# Patient Record
Sex: MALE | Race: WHITE
[De-identification: names, ages, dates, MRNs, and addresses within clinical notes are randomized per-mention and may not be internally consistent; named-entity substitution may affect disease eponyms.]

---

## 2018-05-01 NOTE — HP
DATE OF ADMISSION: 05/04/2018

 

 

REASON FOR ADMISSION:  Complex, chronically incarcerated ventral hernia.

 

BRIEF HISTORY:  This is a 67-year-old gentleman with a known ventral hernia.  He has

had this for many years, and over this time course, the hernia has gotten larger, and

now he wishes to have this repaired.  There are times when he feels some discomfort

in the hernia, and this usually occurs with Valsalva-type maneuvers.  He has had no

change in bowel habits.

 

PAST MEDICAL HISTORY:  No coronary disease, hypertension, or diabetes.

 

PAST SURGICAL HISTORY:  None.

 

MEDICATIONS:  Vitamins.

 

ALLERGIES:  None.

 

SOCIAL HISTORY:  The patient is a .  He does not smoke or drink.

 

PHYSICAL EXAMINATION: 

Patient was examined in erect and supine position.  He has a chronically incarcerated

ventral hernia in the midline.  The defects are not truly appreciated due to its

chronically incarcerated nature.  He has a mild diastasis above the hernia as well.

 

IMPRESSION AND PLAN:  Chronically incarcerated ventral hernia.  This is a 57-year-old

gentleman with an enlarging known chronic hernia.  At this point, it is causing him

some discomfort.  Due to its enlargement, he wishes to have this repaired.  The

patient and I had a long conversation regarding pros and cons of various surgical

approaches, options of doing nothing and observation, repair at this time.  The

patient has opted to proceed with an open repair with a possible component separation

if needed depending on how much frayed tissue is identified at the time of surgery

for a large piece of mesh and repair behind the musculature.  The indications,

alternatives, and complications discussed.  Questions answered.  We will plan to

obtain written consent the day of surgery.

 

 

 

MANGO ROCHA M.D.

 

[id number]

DD: 04/10/2018 12:23

DT: 04/10/2018 12:47

Job #:  [id number]

## 2018-05-04 ENCOUNTER — HOSPITAL ENCOUNTER (INPATIENT)
Dept: HOSPITAL 74 - FASU | Age: 58
LOS: 1 days | Discharge: HOME | DRG: 355 | End: 2018-05-05
Attending: SURGERY | Admitting: SURGERY
Payer: COMMERCIAL

## 2018-05-04 VITALS — BODY MASS INDEX: 26.4 KG/M2

## 2018-05-04 DIAGNOSIS — K43.6: Primary | ICD-10-CM

## 2018-05-04 PROCEDURE — 0WUF0JZ SUPPLEMENT ABDOMINAL WALL WITH SYNTHETIC SUBSTITUTE, OPEN APPROACH: ICD-10-PCS | Performed by: SURGERY

## 2018-05-04 PROCEDURE — 0DBU0ZZ EXCISION OF OMENTUM, OPEN APPROACH: ICD-10-PCS | Performed by: SURGERY

## 2018-05-04 RX ADMIN — ACETAMINOPHEN PRN MG: 325 TABLET ORAL at 18:33

## 2018-05-04 RX ADMIN — IBUPROFEN SCH: 800 INJECTION INTRAVENOUS at 18:59

## 2018-05-04 RX ADMIN — IBUPROFEN SCH MG: 800 INJECTION INTRAVENOUS at 13:51

## 2018-05-04 NOTE — OP
DATE OF OPERATION:  05/04/2018

 

PREOPERATIVE DIAGNOSIS:  Chronically incarcerated complex ventral hernia.

 

POSTOPERATIVE DIAGNOSIS:  Chronically incarcerated complex ventral hernia.

 

PROCEDURE:  Bilateral component separation, repair of incarcerated ventral 
hernia

with mesh, partial omentectomy.  

 

SURGEON:  Juan José Cronin MD 

 

ASSISTANT:  Power العراقي DO

 

ANESTHESIA:  Rudy Cam MD (general).

 

ESTIMATED BLOOD LOSS:  Minimal.

 

SPECIMEN:  Portion of omentum and hernia sac.

 

INDICATION FOR PROCEDURE:  This is a 57-year-old gentleman with a large, 
chronically

incarcerated complex ventral hernia.  It is causing him discomfort.  He wishes 
to

have this repaired.  

 

DESCRIPTION OF PROCEDURE:  The patient was identified and appropriately 
positioned on

the operating room table.  After placement of general anesthesia, the abdomen 
was

prepped and draped in the usual sterile fashion with ChloraPrep.  A midline 
incision

was made and deepened to the subcutaneous tissue.  The hernia was identified and

freed from the subcutaneous tissue down to the level of the fascia 
circumferentially

with blunt and sharp dissection.  The hernia sac was then opened and contained

omentum.  The sac itself was a slider.  Portions of the omentum were serially

clamped, divided and tied with 3-0 chromic sutures.  The sac was oversewn and 
reduced

back into the abdominal cavity.  The right retrorectus space was entered by

identifying the posterior sheath on the right side.  The right sheath was then

scored.  The retrorectus space was subsequently developed bluntly up to the

perforating vessels.  At this point, the fascia was just divided, freeing the

transversus medially and the obliques laterally.  Then, myofascial separation 
ensued

approximately 5-6 inches above and below the actual defect.  The midline fascia 
was

divided in a similar fashion.  

 

Next, attention was focused on the left side and a similar approach to opening 
and

doing the myofascial separation was done.  The left retrorectus space was 
entered by

dividing the left posterior sheath.  This space was developed bluntly to the 
level of

the perforating vessels.  At this point, the transversus was scored and allowed

placement of the mesh way beyond the junction of the transversus, obliques and 
rectus

junction.  Again, this was done approximately 5-6 inches above and below the 
actual

defect.  The posterior layer (transversus) was reapproximated with a running 
locking

3-0 Maxon suture.  The defect was measured and a large 15 x 15 ProGrip as well 
as a 6

x 10 piece of Telio was used for the operative repair.  The mesh was the core.  
The

two pieces were sewn together in a hybrid.  The Telio was placed on the 
transversus

side and the ProGrip with the  up towards the rectus.  The mesh was fanned 
out,

covered the defect, and then anchored with interrupted AbsorbaTack sutures.  
The mesh

itself was irrigated.  The operative field was examined and noted to be 
hemostatic. 

A sponge and instrument count was done at this point.  All sponge and needle 
counts

were correct.  The midline fascia was then reapproximated with interrupted 0 
Prolene

sutures.  The subcutaneous space was irrigated.  The midline was reapproximated 
with

staples.  At the conclusion of this case, sponge and instrument counts were 
correct.

 

ATTESTATION:  Brief operative note handwritten on the preprinted form.  [facility]

will be queried prior to giving any narcotics.  

 

DANIELA MARTINES [id number]

DD: 05/04/2018 11:22

DT: 05/04/2018 11:59

Job #:  [id number]



cc:  Calos Carver MD 

MTDD

## 2018-05-05 VITALS — SYSTOLIC BLOOD PRESSURE: 120 MMHG | DIASTOLIC BLOOD PRESSURE: 77 MMHG | HEART RATE: 52 BPM | TEMPERATURE: 97.5 F

## 2018-05-05 RX ADMIN — IBUPROFEN SCH MG: 800 INJECTION INTRAVENOUS at 00:34

## 2018-05-05 RX ADMIN — IBUPROFEN SCH: 800 INJECTION INTRAVENOUS at 06:56

## 2018-05-05 RX ADMIN — ACETAMINOPHEN PRN MG: 325 TABLET ORAL at 04:39

## 2018-05-08 NOTE — PATH
Surgical Pathology Report



Patient Name:  HORACE ROGERS

Accession #:  

Med. Rec. #:  [id number]                                                        

   /Age/Gender:  1960 (Age: 57) / M

Account:  [id number]                                                          

             Location: [facility]

Taken:  2018

Received:  2018

Reported:  2018

Physicians:  Juan José Cronin

  



Specimen(s) Received

 PORTION OF HERNIA SAC WITH OMENTUM 





Clinical History

Other and unspecified ventral hernia







Final Diagnosis

PORTION OF HERNIA SAC WITH OMENTUM, RESECTION:

PORTION OF MATURE ADIPOSE TISSUE AND HERNIA SAC WITH FOCAL CHRONIC INFLAMMATION.





***Electronically Signed***

Matthew Martinez M.D.





Gross Description

Received in formalin labeled "portion of hernia sac with omentum," is a 3.5 x

2.8 x 1.8 cm tan-gray portion of fibromembranous tissue with attached fat,

consistent with a hernia sac. A representative section is submitted in one

cassette.

/2018



[place]audi